# Patient Record
Sex: MALE
[De-identification: names, ages, dates, MRNs, and addresses within clinical notes are randomized per-mention and may not be internally consistent; named-entity substitution may affect disease eponyms.]

---

## 2020-12-25 ENCOUNTER — HOSPITAL ENCOUNTER (INPATIENT)
Dept: HOSPITAL 60 - LB.ED | Age: 60
Discharge: HOME | DRG: 896 | End: 2020-12-25
Attending: SURGERY | Admitting: SURGERY
Payer: SELF-PAY

## 2020-12-25 DIAGNOSIS — Z20.828: ICD-10-CM

## 2020-12-25 DIAGNOSIS — S06.5X9A: ICD-10-CM
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
## 2020-12-25 DIAGNOSIS — F10.231: Primary | ICD-10-CM

## 2020-12-25 DIAGNOSIS — I10: ICD-10-CM

## 2020-12-25 PROCEDURE — A0425 GROUND MILEAGE: HCPCS

## 2020-12-25 PROCEDURE — A0429 BLS-EMERGENCY: HCPCS

## 2020-12-25 PROCEDURE — U0002 COVID-19 LAB TEST NON-CDC: HCPCS

## 2020-12-25 NOTE — CT
CLINICAL DATA:  Unexplained tremors.



UNENHANCED BRAIN CT, 25 DECEMBER 2020:  



Multislice acquisition without IV contrast was performed.  Repeat imaging was 
also performed.  There is significant motion artifact on both series.  



There is mild atrophy. 



No masses or mass effect.  No intracranial hemorrhage.  No evidence of acute or 
subacute infarct.  



No osseous abnormalities.  



IMPRESSION:  Suboptimal exam related to motion artifact.  No acute 
abnormalities.  



Job:  [id number]

MTDD

## 2020-12-25 NOTE — PCM.DCSUM1
**Discharge Summary





- Hospital Course


HPI Initial Comments: 





presented to the ER due to shakiness of b/l UEs.


h/o + ETOH intake, 12 packs per day, last was 24 hr


h/o head injury 2-3 days after a fall -didn't seek medical attention 


CT head in the ER wasn't conclusive - but showed a possible small frontal SDH





was also found to have high BP .





was admitted to the floor for CIWA protocol, BP control and repeat CT head.


Diagnosis: Stroke: No





- Discharge Data


Discharge Date: 12/25/20


Discharge Disposition: Home, Self-Care 01


Condition: Good





- Referral to Home Health


Primary Care Physician: 


PCP None








- Discharge Diagnosis/Problem(s)


(1) Delirium tremens


SNOMED Code(s): 7655926


   ICD Code: F10.231 - ALCOHOL DEPENDENCE WITH WITHDRAWAL DELIRIUM   Status: 

Acute   Priority: Medium   Current Visit: Yes   





(2) Head injury


SNOMED Code(s): 02348812


   ICD Code: S09.90XA - UNSPECIFIED INJURY OF HEAD, INITIAL ENCOUNTER   Status: 

Acute   Priority: Low   Current Visit: Yes   


Qualifiers: 


   Encounter type: initial encounter   Qualified Code(s): S09.90XA - Unspecified

injury of head, initial encounter   





(3) Hypertension


SNOMED Code(s): 13423673


   ICD Code: I10 - ESSENTIAL (PRIMARY) HYPERTENSION   Status: Acute   Priority: 

Medium   Current Visit: Yes   


Qualifiers: 


   Hypertension type: unspecified   Qualified Code(s): I10 - Essential (primary)

hypertension   





- Patient Summary/Data


Hospital Course: 





patient was admitted to the floor.


CIWA protocol and ativan as needed


Didn't require more ativan overnight and reports the shakiness have resolved and

feeling better


CT head was repeated - no e/o mass bleeding, questionable small 3mm SD hematoma 

or calcification - which is assuring since no changes in CT findings.


BP was controlled with IV labetalol initially then with PO lisinopril 





tolerated PO diet and ambulating without distress. 





- Patient Instructions


Diet: Heart Healthy Diet


Activity: As Tolerated


Driving: May Drive Today


Showering/Bathing: May Shower


Notify Provider of: Fever, Increased Pain, Swelling and Redness, Nausea and/or 

Vomiting





- Discharge Plan


*PRESCRIPTION DRUG MONITORING PROGRAM REVIEWED*: Not Applicable


*COPY OF PRESCRIPTION DRUG MONITORING REPORT IN PATIENT JYOTI: Not Applicable


Patient Handouts:  Delirium Tremens, Hypertension, Adult


Forms:  ED Department Discharge


Referrals: 


PCP,None [Primary Care Provider] - 





- Discharge Summary/Plan Comment


DC Time >30 min.: Yes





- General Info


Functional Status: Reports: Pain Controlled, Tolerating Diet, Ambulating, 

Urinating, New Symptoms





- Review of Systems


General: Reports: No Symptoms


HEENT: Reports: No Symptoms


Pulmonary: Reports: No Symptoms


Cardiovascular: Reports: No Symptoms


Gastrointestinal: Reports: No Symptoms


Genitourinary: Reports: No Symptoms


Musculoskeletal: Reports: No Symptoms


Skin: Reports: No Symptoms


Neurological: Reports: No Symptoms


Psychiatric: Reports: No Symptoms





- Patient Data


Vitals - Most Recent: 


                                Last Vital Signs











Temp  37.1 C   12/25/20 09:09


 


Pulse  78   12/25/20 09:09


 


Resp  16   12/25/20 09:09


 


BP  175/100 H  12/25/20 12:14


 


Pulse Ox  95   12/25/20 09:09











Weight - Most Recent: 104.78 kg


I&O - Last 24 hours: 


                                 Intake & Output











 12/24/20 12/25/20 12/25/20





 22:59 06:59 14:59


 


Intake Total  295 


 


Balance  295 











Lab Results - Last 24 hrs: 


                         Laboratory Results - last 24 hr











  12/25/20 12/25/20 12/25/20 Range/Units





  01:30 01:40 01:40 


 


WBC  6.4    (4.0-11.0)  K/uL


 


RBC  4.09 L    (4.50-6.50)  M/uL


 


Hgb  13.7    (13.0-18.0)  g/dL


 


Hct  40.6    (40.0-54.0)  %


 


MCV  99 H    (76-96)  fL


 


MCH  33.5 H    (27.0-32.0)  pg


 


MCHC  33.7    (31.0-35.0)  g/dL


 


RDW  13.1    (11.0-16.0)  %


 


Plt Count  187    (150-400)  K/uL


 


MPV  10.1 H    (6.0-10.0)  fL


 


Neut % (Auto)  60.2    (45.0-70.0)  %


 


Lymph % (Auto)  25.8    (20.0-40.0)  %


 


Mono % (Auto)  10.7 H    (3.0-10.0)  %


 


Eos % (Auto)  3.0    (1.0-5.0)  %


 


Baso % (Auto)  0.3    (0.0-0.5)  %


 


Neut # (Auto)  3.87    (2.00-7.50)  K/uL


 


Lymph # (Auto)  1.66    (1.50-4.00)  K/uL


 


Mono # (Auto)  0.69    (0.20-0.80)  K/uL


 


Eos # (Auto)  0.19    (0.04-0.40)  K/uL


 


Baso # (Auto)  0.02    (0.02-0.10)  K/uL


 


PT     (9.0-11.5)  sec


 


INR     (1.0-3.5)  


 


Sodium   140   (136-145)  mmol/L


 


Potassium   4.1   (3.5-5.1)  mmol/L


 


Chloride   103   ()  mmol/L


 


Carbon Dioxide   26.1   (21.0-32.0)  mmol/L


 


Anion Gap   15.0   (5.0-15.0)  mmol/L


 


BUN   15   (8-26)  mg/dL


 


Creatinine   0.99   (0.70-1.30)  mg/dL


 


Est Cr Clr Drug Dosing   83.46   mL/min


 


Estimated GFR (MDRD)   > 60   (>60)  MLS/MIN


 


BUN/Creatinine Ratio   15.2   (6-25)  


 


Glucose   103 H   ()  mg/dL


 


Calcium   8.9   (8.5-10.1)  mg/dL


 


Phosphorus   2.9   (2.5-4.9)  mg/dL


 


Magnesium   1.8   (1.8-2.4)  mg/dL


 


Total Bilirubin   0.2   (0.0-1.0)  mg/dL


 


AST   38 H   (15-37)  U/L


 


ALT   66   (12-78)  U/L


 


Alkaline Phosphatase   101   ()  U/L


 


Troponin I    0.020  (0.000-0.060)  ng/mL


 


Total Protein   7.1   (6.4-8.2)  g/dL


 


Albumin   3.9   (3.4-5.0)  g/dL


 


Globulin   3.2   (2.2-4.2)  g/dL


 


Albumin/Globulin Ratio   1.2   (0.8-2.0)  


 


SARS-CoV-2 RNA (JOE)     (NEGATIVE)  














  12/25/20 12/25/20 Range/Units





  01:40 02:45 


 


WBC    (4.0-11.0)  K/uL


 


RBC    (4.50-6.50)  M/uL


 


Hgb    (13.0-18.0)  g/dL


 


Hct    (40.0-54.0)  %


 


MCV    (76-96)  fL


 


MCH    (27.0-32.0)  pg


 


MCHC    (31.0-35.0)  g/dL


 


RDW    (11.0-16.0)  %


 


Plt Count    (150-400)  K/uL


 


MPV    (6.0-10.0)  fL


 


Neut % (Auto)    (45.0-70.0)  %


 


Lymph % (Auto)    (20.0-40.0)  %


 


Mono % (Auto)    (3.0-10.0)  %


 


Eos % (Auto)    (1.0-5.0)  %


 


Baso % (Auto)    (0.0-0.5)  %


 


Neut # (Auto)    (2.00-7.50)  K/uL


 


Lymph # (Auto)    (1.50-4.00)  K/uL


 


Mono # (Auto)    (0.20-0.80)  K/uL


 


Eos # (Auto)    (0.04-0.40)  K/uL


 


Baso # (Auto)    (0.02-0.10)  K/uL


 


PT  9.8   (9.0-11.5)  sec


 


INR  1.0   (1.0-3.5)  


 


Sodium    (136-145)  mmol/L


 


Potassium    (3.5-5.1)  mmol/L


 


Chloride    ()  mmol/L


 


Carbon Dioxide    (21.0-32.0)  mmol/L


 


Anion Gap    (5.0-15.0)  mmol/L


 


BUN    (8-26)  mg/dL


 


Creatinine    (0.70-1.30)  mg/dL


 


Est Cr Clr Drug Dosing    mL/min


 


Estimated GFR (MDRD)    (>60)  MLS/MIN


 


BUN/Creatinine Ratio    (6-25)  


 


Glucose    ()  mg/dL


 


Calcium    (8.5-10.1)  mg/dL


 


Phosphorus    (2.5-4.9)  mg/dL


 


Magnesium    (1.8-2.4)  mg/dL


 


Total Bilirubin    (0.0-1.0)  mg/dL


 


AST    (15-37)  U/L


 


ALT    (12-78)  U/L


 


Alkaline Phosphatase    ()  U/L


 


Troponin I    (0.000-0.060)  ng/mL


 


Total Protein    (6.4-8.2)  g/dL


 


Albumin    (3.4-5.0)  g/dL


 


Globulin    (2.2-4.2)  g/dL


 


Albumin/Globulin Ratio    (0.8-2.0)  


 


SARS-CoV-2 RNA (JOE)   Negative  (NEGATIVE)  











Med Orders - Current: 


                               Current Medications





Labetalol HCl (Normodyne)  20 mg IVPUSH Q4H PRN; Protocol


   PRN Reason: blood pressure >150


   Last Admin: 12/25/20 10:14 Dose:  20 ml


   Documented by: 


Lisinopril (Prinivil)  2.5 mg PO DAILY [facility]


   Last Admin: 12/25/20 12:14 Dose:  2.5 mg


   Documented by: 


Lorazepam (Ativan)  0 mg IVPUSH Q4H PRN; Protocol


   PRN Reason: CIWWA


   Last Admin: 12/25/20 02:29 Dose:  1 mg


   Documented by: 


Lorazepam (Ativan)  1 mg IVPUSH Q1H PRN


   PRN Reason: Agitation





Discontinued Medications





Clonidine HCl (Catapres)  0.1 mg PO ONETIME ONE


   Stop: 12/25/20 02:18


   Last Admin: 12/25/20 02:19 Dose:  0.1 mg


   Documented by: 


Clonidine HCl (Catapres) Confirm Administered Dose 0.1 mg .ROUTE .STK-MED ONE


   Stop: 12/25/20 02:30


   Last Admin: 12/25/20 02:51 Dose:  Not Given


   Documented by: 


Metoprolol Tartrate 5 mg/ (Sodium Chloride)  55 mls @ 100 mls/hr IV ONETIME ONE


   Stop: 12/25/20 01:56


   Last Admin: 12/25/20 01:55 Dose:  100 mls/hr


   Documented by: 


Multivitamins/Minerals 10 ml/Thiamine HCl 100 mg/ Folic Acid 1 mg/ Magnesium 

Sulfate 3 gm/ Sodium Chloride  1,017.2 mls @ 150 mls/hr IV ASDIRECTED [facility]


   Last Admin: 12/25/20 04:17 Dose:  150 mls/hr


   Documented by: 


Sodium Chloride (Normal Saline) Confirm Administered Dose 50 mls @ as directed 

.ROUTE .STK-MED ONE


   Stop: 12/25/20 09:56


   Last Admin: 12/25/20 13:30 Dose:  Not Given


   Documented by: 


Lorazepam (Ativan)  1 mg IVPUSH ONETIME ONE


   Stop: 12/25/20 02:08


   Last Admin: 12/25/20 02:07 Dose:  1 mg


   Documented by: 


Lorazepam (Ativan) Confirm Administered Dose 2 mg .ROUTE .STK-MED ONE


   Stop: 12/25/20 02:17


   Last Admin: 12/25/20 02:18 Dose:  Not Given


   Documented by: 


Metoprolol Tartrate (Lopressor) Confirm Administered Dose 5 mg .ROUTE .STK-MED 

ONE


   Stop: 12/25/20 01:58


   Last Admin: 12/25/20 01:56 Dose:  Not Given


   Documented by: 











- Exam


General: Reports: Alert, Oriented, Cooperative, No Acute Distress


HEENT: Reports: Pupils Equal, Pupils Reactive, EOMI


Neck: Reports: Supple


Lungs: Reports: Clear to Auscultation, Normal Respiratory Effort


Cardiovascular: Reports: Regular Rate, Regular Rhythm


GI/Abdominal Exam: Normal Bowel Sounds, Soft, Non-Tender


Extremities: Normal Inspection, Normal Range of Motion, Non-Tender


Neurological: Reports: No New Focal Deficit, Normal Gait, Normal Speech, Normal 

Tone, Strength Equal Bilateral, Reflexes Equal Bilateral, Sensation Intact


Psy/Mental Status: Reports: Alert, Normal Affect, Normal Mood

## 2020-12-25 NOTE — EDM.PDOC
ED HPI GENERAL MEDICAL PROBLEM





- General


Chief Complaint: General


Stated Complaint: TWITCHING


Time Seen by Provider: 12/25/20 00:45


Source of Information: Reports: Patient


History Limitations: Reports: No Limitations





- History of Present Illness


INITIAL COMMENTS - FREE TEXT/NARRATIVE: 





patient presented to the ER due to '' uncontrollable tremors '' after a head 

injury due to fall 2 days ago.





He reports that he slipped on ice and landed on the back of his head last 

Tuesday ( 2 days ago), resulting on a wound that he decided to clean up. 


He isn't assure about an LOC. unwitnessed fall. 





Reports that he started to experience shakiness in b/l arms since then,, 

intermittent and worse on rest. It has slowly got worse over time ( more 

frequent ), and resolve when he moves around. 





No headache, no vision problems, no nausea/emesis, no weakness or numbness or 

incontinence. 





Not on any blood thinners.





Reports a h/o HTN for which he used to take diuretics but he quit those few 

years ago. 





No PCP and not on any meds currently.





From [place], here for [employer]. 





A .





h/o alcohol consumption. around 12 packs of beer daily, last consumption was 

yesterday.  





He was brought by EMS, and BP was noted to be high to 200/100





- Related Data


                                    Allergies











Allergy/AdvReac Type Severity Reaction Status Date / Time


 


No Known Allergies Allergy   Verified 12/25/20 01:06














Past Medical History


Cardiovascular History: Reports: Hypertension


Respiratory History: Reports: None


Gastrointestinal History: Reports: None


Genitourinary History: Reports: None


Endocrine/Metabolic History: Reports: None





- Past Surgical History


Head Surgeries/Procedures: Reports: None


HEENT Surgical History: Reports: None





Social & Family History





- Tobacco Use


Tobacco Use Status *Q: Never Tobacco User





- Alcohol Use


Days Per Week of Alcohol Use: 7


Number of Drinks Per Day: 6


Total Drinks Per Week: 42





- Recreational Drug Use


Recreational Drug Use: No





ED ROS GENERAL





- Review of Systems


Review Of Systems: Comprehensive ROS is negative, except as noted in HPI.


Constitutional: Reports: No Symptoms


HEENT: Reports: No Symptoms


Respiratory: Reports: No Symptoms


Cardiovascular: Reports: No Symptoms


Endocrine: Reports: No Symptoms


GI/Abdominal: Reports: No Symptoms


: Reports: No Symptoms


Neurological: Reports: Other (shakiness)





ED EXAM, GENERAL





- Physical Exam


Exam: See Below


Exam Limited By: No Limitations


General Appearance: Alert, WD/WN, No Apparent Distress


Eye Exam: Right Eye: Normal Inspection, PERRL


Nose: Normal Inspection


Head: Other (there is a 3cm laceration on the back of his head. no active 

bleeding)


Respiratory/Chest: No Respiratory Distress, Lungs Clear, Normal Breath Sounds


Cardiovascular: Normal Peripheral Pulses


GI/Abdominal: Normal Bowel Sounds


Extremities: Normal Inspection, No Pedal Edema


Neurological: Alert, Oriented, CN II-XII Intact, No Motor/Sensory Deficits, 

Other (uncontrollable shakiness was noted b/l UEs).  No: Memory Loss Remote 

Events, Memory Loss Recent Events, Sensory/Motor Deficit


Psychiatric: Normal Affect, Normal Mood


Skin Exam: Intact, Normal Color, No Rash





Course





- Vital Signs


Last Recorded V/S: 


                                Last Vital Signs











Temp  37.0 C   12/25/20 02:15


 


Pulse  79   12/25/20 02:35


 


Resp  16   12/25/20 02:35


 


BP  148/93 H  12/25/20 02:35


 


Pulse Ox  94 L  12/25/20 02:35














- Orders/Labs/Meds


Orders: 


                               Active Orders 24 hr











 Category Date Time Status


 


 Head wo Cont [CT] Stat Exams  12/25/20 00:47 Ordered


 


 LORazepam [Ativan] Med  12/25/20 02:25 Active





 See Protocol  IVPUSH Q4H PRN   








                                Medication Orders





Lorazepam (Ativan)  0 mg IVPUSH Q4H PRN; Protocol


   PRN Reason: CIWWA


   Last Admin: 12/25/20 02:29  Dose: 1 mg


   Documented by: [id number]








Labs: 


                                Laboratory Tests











  12/25/20 12/25/20 12/25/20 Range/Units





  01:30 01:40 01:40 


 


WBC  6.4    (4.0-11.0)  K/uL


 


RBC  4.09 L    (4.50-6.50)  M/uL


 


Hgb  13.7    (13.0-18.0)  g/dL


 


Hct  40.6    (40.0-54.0)  %


 


MCV  99 H    (76-96)  fL


 


MCH  33.5 H    (27.0-32.0)  pg


 


MCHC  33.7    (31.0-35.0)  g/dL


 


RDW  13.1    (11.0-16.0)  %


 


Plt Count  187    (150-400)  K/uL


 


MPV  10.1 H    (6.0-10.0)  fL


 


Neut % (Auto)  60.2    (45.0-70.0)  %


 


Lymph % (Auto)  25.8    (20.0-40.0)  %


 


Mono % (Auto)  10.7 H    (3.0-10.0)  %


 


Eos % (Auto)  3.0    (1.0-5.0)  %


 


Baso % (Auto)  0.3    (0.0-0.5)  %


 


Neut # (Auto)  3.87    (2.00-7.50)  K/uL


 


Lymph # (Auto)  1.66    (1.50-4.00)  K/uL


 


Mono # (Auto)  0.69    (0.20-0.80)  K/uL


 


Eos # (Auto)  0.19    (0.04-0.40)  K/uL


 


Baso # (Auto)  0.02    (0.02-0.10)  K/uL


 


PT     (9.0-11.5)  sec


 


INR     (1.0-3.5)  


 


Sodium   140   (136-145)  mmol/L


 


Potassium   4.1   (3.5-5.1)  mmol/L


 


Chloride   103   ()  mmol/L


 


Carbon Dioxide   26.1   (21.0-32.0)  mmol/L


 


Anion Gap   15.0   (5.0-15.0)  mmol/L


 


BUN   15   (8-26)  mg/dL


 


Creatinine   0.99   (0.70-1.30)  mg/dL


 


Est Cr Clr Drug Dosing   83.46   mL/min


 


Estimated GFR (MDRD)   > 60   (>60)  MLS/MIN


 


BUN/Creatinine Ratio   15.2   (6-25)  


 


Glucose   103 H   ()  mg/dL


 


Calcium   8.9   (8.5-10.1)  mg/dL


 


Phosphorus   2.9   (2.5-4.9)  mg/dL


 


Magnesium   1.8   (1.8-2.4)  mg/dL


 


Total Bilirubin   0.2   (0.0-1.0)  mg/dL


 


AST   38 H   (15-37)  U/L


 


ALT   66   (12-78)  U/L


 


Alkaline Phosphatase   101   ()  U/L


 


Troponin I    0.020  (0.000-0.060)  ng/mL


 


Total Protein   7.1   (6.4-8.2)  g/dL


 


Albumin   3.9   (3.4-5.0)  g/dL


 


Globulin   3.2   (2.2-4.2)  g/dL


 


Albumin/Globulin Ratio   1.2   (0.8-2.0)  














  12/25/20 Range/Units





  01:40 


 


WBC   (4.0-11.0)  K/uL


 


RBC   (4.50-6.50)  M/uL


 


Hgb   (13.0-18.0)  g/dL


 


Hct   (40.0-54.0)  %


 


MCV   (76-96)  fL


 


MCH   (27.0-32.0)  pg


 


MCHC   (31.0-35.0)  g/dL


 


RDW   (11.0-16.0)  %


 


Plt Count   (150-400)  K/uL


 


MPV   (6.0-10.0)  fL


 


Neut % (Auto)   (45.0-70.0)  %


 


Lymph % (Auto)   (20.0-40.0)  %


 


Mono % (Auto)   (3.0-10.0)  %


 


Eos % (Auto)   (1.0-5.0)  %


 


Baso % (Auto)   (0.0-0.5)  %


 


Neut # (Auto)   (2.00-7.50)  K/uL


 


Lymph # (Auto)   (1.50-4.00)  K/uL


 


Mono # (Auto)   (0.20-0.80)  K/uL


 


Eos # (Auto)   (0.04-0.40)  K/uL


 


Baso # (Auto)   (0.02-0.10)  K/uL


 


PT  9.8  (9.0-11.5)  sec


 


INR  1.0  (1.0-3.5)  


 


Sodium   (136-145)  mmol/L


 


Potassium   (3.5-5.1)  mmol/L


 


Chloride   ()  mmol/L


 


Carbon Dioxide   (21.0-32.0)  mmol/L


 


Anion Gap   (5.0-15.0)  mmol/L


 


BUN   (8-26)  mg/dL


 


Creatinine   (0.70-1.30)  mg/dL


 


Est Cr Clr Drug Dosing   mL/min


 


Estimated GFR (MDRD)   (>60)  MLS/MIN


 


BUN/Creatinine Ratio   (6-25)  


 


Glucose   ()  mg/dL


 


Calcium   (8.5-10.1)  mg/dL


 


Phosphorus   (2.5-4.9)  mg/dL


 


Magnesium   (1.8-2.4)  mg/dL


 


Total Bilirubin   (0.0-1.0)  mg/dL


 


AST   (15-37)  U/L


 


ALT   (12-78)  U/L


 


Alkaline Phosphatase   ()  U/L


 


Troponin I   (0.000-0.060)  ng/mL


 


Total Protein   (6.4-8.2)  g/dL


 


Albumin   (3.4-5.0)  g/dL


 


Globulin   (2.2-4.2)  g/dL


 


Albumin/Globulin Ratio   (0.8-2.0)  











Meds: 


Medications











Generic Name Dose Route Start Last Admin





  Trade Name Freq  PRN Reason Stop Dose Admin


 


Lorazepam  0 mg  12/25/20 02:25  12/25/20 02:29





  Ativan  IVPUSH   1 mg





  Q4H PRN   Administration





  CIWWA  





  Protocol  














Discontinued Medications














Generic Name Dose Route Start Last Admin





  Trade Name Freq  PRN Reason Stop Dose Admin


 


Clonidine HCl  0.1 mg  12/25/20 02:17  12/25/20 02:19





  Catapres  PO  12/25/20 02:18  0.1 mg





  ONETIME ONE   Administration


 


Clonidine HCl  Confirm  12/25/20 02:29 





  Catapres  Administered  12/25/20 02:30 





  Dose  





  0.1 mg  





  .ROUTE  





  .STK-MED ONE  


 


Metoprolol Tartrate 5 mg/  55 mls @ 100 mls/hr  12/25/20 01:24  12/25/20 01:55





  Sodium Chloride  IV  12/25/20 01:56  100 mls/hr





  ONETIME ONE   Administration


 


Lorazepam  1 mg  12/25/20 02:07  12/25/20 02:07





  Ativan  IVPUSH  12/25/20 02:08  1 mg





  ONETIME ONE   Administration


 


Lorazepam  Confirm  12/25/20 02:16  12/25/20 02:18





  Ativan  Administered  12/25/20 02:17  Not Given





  Dose  





  2 mg  





  .ROUTE  





  .STK-MED ONE  


 


Metoprolol Tartrate  Confirm  12/25/20 01:57  12/25/20 01:56





  Lopressor  Administered  12/25/20 01:58  Not Given





  Dose  





  5 mg  





  .ROUTE  





  .STK-MED ONE  














- Re-Assessments/Exams


Free Text/Narrative Re-Assessment/Exam: 





12/25/20 02:42


upon arrival to the ER, 


he was connected to a monitor.


BP was noted to be elevated to 190/110. HR 90's. RR18.





CT head was ordered - possible 3mm SDH in the frontal sulci area, no mass or 

midline shift. 





labs - CBC, CMP were ordered. Mild elevation of AST. 





CIWA was measured - 9 score - c/w mild/mod symptoms.





IV Ativan 1mg was given - this helped with the shakiness - was repeated for a 

total dose of 2mg in the ER.





Lopressor 5 mg IV was given to control BP - which responded to it nicely








discussed the findings with the patient 





given the concerns for DT - decision was to admit the patient to the floor for 

BP control and DT control.





Also will repeat his CT scan of head in the morning - for comparison.














Departure





- Departure


Time of Disposition: 02:40


Disposition: Admitted As Inpatient 65


Condition: Fair


Clinical Impression: 


 Delirium tremens





Head injury


Qualifiers:


 Encounter type: initial encounter Qualified Code(s): S09.90XA - Unspecified 

injury of head, initial encounter





Hypertension


Qualifiers:


 Hypertension type: unspecified Qualified Code(s): I10 - Essential (primary) 

hypertension








- Discharge Information


*PRESCRIPTION DRUG MONITORING PROGRAM REVIEWED*: Not Applicable


*COPY OF PRESCRIPTION DRUG MONITORING REPORT IN PATIENT JYOTI: Not Applicable


Referrals: 


PCP,None [Primary Care Provider] - 


Forms:  ED Department Discharge





Sepsis Event Note (ED)





- Evaluation


Sepsis Screening Result: No Definite Risk





- Focused Exam


Vital Signs: 


                                   Vital Signs











  Temp Pulse Pulse Resp BP BP Pulse Ox


 


 12/25/20 02:35    79  16   148/93 H  94 L


 


 12/25/20 02:23    83  16   151/109 H  95


 


 12/25/20 02:19      157/110 H  


 


 12/25/20 02:15  37.0 C   85  16   157/110 H  94 L


 


 12/25/20 01:55   94    177/120 H  


 


 12/25/20 01:05  36.6 C   94  18   185/114 H  97


 


 12/25/20 00:35  36.6 C   95  18   185/115 H  97














- Problem List & Annotations


(1) Delirium tremens


SNOMED Code(s): 5581008


   Code(s): F10.231 - ALCOHOL DEPENDENCE WITH WITHDRAWAL DELIRIUM   Status: 

Acute   Priority: Medium   Current Visit: Yes   





(2) Head injury


SNOMED Code(s): 02979953


   Code(s): S09.90XA - UNSPECIFIED INJURY OF HEAD, INITIAL ENCOUNTER   Status: 

Acute   Priority: Low   Current Visit: Yes   


Qualifiers: 


   Encounter type: initial encounter   Qualified Code(s): S09.90XA - Unspecified

injury of head, initial encounter   





(3) Hypertension


SNOMED Code(s): 83574897


   Code(s): I10 - ESSENTIAL (PRIMARY) HYPERTENSION   Status: Acute   Priority: 

Medium   Current Visit: Yes   


Qualifiers: 


   Hypertension type: unspecified   Qualified Code(s): I10 - Essential (primary)

hypertension   





- My Orders


Last 24 Hours: 


My Active Orders





12/25/20 00:47


Head wo Cont [CT] Stat 





12/25/20 02:25


LORazepam [Ativan]   See Protocol  IVPUSH Q4H PRN 














- Assessment/Plan


Admission H&P: Please use this note as an admission H&P


Last 24 Hours: 


My Active Orders





12/25/20 00:47


Head wo Cont [CT] Stat 





12/25/20 02:25


LORazepam [Ativan]   See Protocol  IVPUSH Q4H PRN 











Plan: 








DT - follow [facility] protocol. Benzo, clonidine, banana bag





HTN - will control with IV meds





head injury - will repeat CT after 12 hrs for comparison

## 2020-12-27 NOTE — CT
Date of Service:  12/25/20 at 1405 hours

Clinical Data:  head injury



UNENHANCED BRAIN CT:  



Multislice acquisition through the brain without IV contrast was performed.  



Comparison is made to a prior exam dated 12/25/20 at 0103 hours.  



No masses or mass effect.  No intracranial hemorrhage.  No evidence of acute or 
subacute infarct.  



There is a scalp hematoma in the right parietal and posterior parietal region.  
No fractures.  



IMPRESSION:  No acute intracranial abnormalities.  



[id number]

[facility]